# Patient Record
Sex: MALE | Race: WHITE | Employment: UNEMPLOYED | URBAN - METROPOLITAN AREA
[De-identification: names, ages, dates, MRNs, and addresses within clinical notes are randomized per-mention and may not be internally consistent; named-entity substitution may affect disease eponyms.]

---

## 2023-11-23 ENCOUNTER — HOSPITAL ENCOUNTER (EMERGENCY)
Facility: HOSPITAL | Age: 43
Discharge: HOME | End: 2023-11-23
Attending: EMERGENCY MEDICINE

## 2023-11-23 VITALS
RESPIRATION RATE: 18 BRPM | HEART RATE: 72 BPM | BODY MASS INDEX: 30.8 KG/M2 | OXYGEN SATURATION: 99 % | HEIGHT: 71 IN | SYSTOLIC BLOOD PRESSURE: 136 MMHG | TEMPERATURE: 98.2 F | WEIGHT: 220 LBS | DIASTOLIC BLOOD PRESSURE: 80 MMHG

## 2023-11-23 DIAGNOSIS — T78.40XA ALLERGIC REACTION, INITIAL ENCOUNTER: Primary | ICD-10-CM

## 2023-11-23 PROCEDURE — 99284 EMERGENCY DEPT VISIT MOD MDM: CPT | Performed by: EMERGENCY MEDICINE

## 2023-11-23 PROCEDURE — 99283 EMERGENCY DEPT VISIT LOW MDM: CPT

## 2023-11-23 RX ORDER — FAMOTIDINE 20 MG/1
20 TABLET, FILM COATED ORAL ONCE
Status: COMPLETED | OUTPATIENT
Start: 2023-11-23 | End: 2023-11-23

## 2023-11-23 RX ORDER — EPINEPHRINE 0.3 MG/.3ML
0.3 INJECTION SUBCUTANEOUS ONCE
Qty: 0.6 ML | Refills: 0 | Status: SHIPPED | OUTPATIENT
Start: 2023-11-23 | End: 2023-11-23

## 2023-11-23 RX ADMIN — FAMOTIDINE 20 MG: 20 TABLET, FILM COATED ORAL at 03:24

## 2023-11-23 RX ADMIN — DEXAMETHASONE 10 MG: 4 TABLET ORAL at 03:22

## 2023-11-23 NOTE — ED PROVIDER NOTES
History  Chief Complaint   Patient presents with    Medical Problem     Pt ate food, woke up with abd pain and diarrhea, reports hives all over, no known allergies, denies throat swelling     HPI  MDM  Pt is a 82-year-old male, no known allergies, presents with allergic reaction. States that he had dinner and slept last night and woke up with diffuse hives and also had an episode of diarrhea. States he had shrimp for dinner, and had that before as well without any allergic reaction. Denies throat swelling, wheezing, headache, lightheadedness, abdominal pain, emesis. Patient currently complains of itching. Took 1 dose of 25 mg Benadryl and came into the ED for further evaluation. On exam   General: VSS, NAD, awake, alert. Talking normally. Head: Normocephalic, atraumatic, nontender. Eyes: EOM-No subconjunctival hemorrhages. ENT: Nose atraumatic. MMM  No malocclusion. No stridor. Normal phonation. No drooling. Normal swallowing. Neck: Trachea midline. No JVD. CV: RRR. Lungs: CTAB No tachypnea. No paradoxical motion. Abd: +BS, soft, NT/ND. No guarding/rigidity. MSK: Full ROM throughout. No lower extremity edema. Skin: Diffuse urticaria. Neuro: AAOx3, GCS 15, CN II-XII grossly intact. Motor/sensory grossly intact. Psychiatric/Behavioral: mood/affect normal; behavior normal; thought content normal; judgement normal   Exam: deferred      Ddx: Allergic reaction    Plan: Patient is not wheezing, blood pressure normotensive, no signs of anaphylaxis. Given urticaria plus diarrhea patient was symptomatically treated with Decadron and Pepcid. Was discharged with prescription for EpiPen and outpatient follow-up with allergy. Discussed strict return precautions. Final Dispo: Pt is hemodynamically stable and clear for discharge with outpatient f/u with their PCP. Return precautions given pt verbalized understanding    None       History reviewed. No pertinent past medical history.     History reviewed. No pertinent surgical history. History reviewed. No pertinent family history. I have reviewed and agree with the history as documented. E-Cigarette/Vaping     E-Cigarette/Vaping Substances           Review of Systems    Physical Exam  ED Triage Vitals [11/23/23 0207]   Temperature Pulse Respirations Blood Pressure SpO2   98.2 °F (36.8 °C) 72 18 136/80 99 %      Temp Source Heart Rate Source Patient Position - Orthostatic VS BP Location FiO2 (%)   Oral Monitor Sitting Left arm --      Pain Score       10 - Worst Possible Pain             Orthostatic Vital Signs  Vitals:    11/23/23 0207   BP: 136/80   Pulse: 72   Patient Position - Orthostatic VS: Sitting       Physical Exam    ED Medications  Medications   dexamethasone (DECADRON) tablet 10 mg (10 mg Oral Given 11/23/23 0322)   famotidine (PEPCID) tablet 20 mg (20 mg Oral Given 11/23/23 0324)       Diagnostic Studies  Results Reviewed       None                   No orders to display         Procedures  Procedures      ED Course                                       Medical Decision Making  Risk  Prescription drug management. Disposition  Final diagnoses: Allergic reaction, initial encounter     Time reflects when diagnosis was documented in both MDM as applicable and the Disposition within this note       Time User Action Codes Description Comment    11/23/2023  3:38 AM Check, Anne Nageotte Add [T78.40XA] Allergic reaction, initial encounter           ED Disposition       ED Disposition   Discharge    Condition   Stable    Date/Time   u Nov 23, 2023  3:38 AM    Comment   Edy Zeng discharge to home/self care.                    Follow-up Information       Follow up With Specialties Details Why Contact Info Additional [address] [facility] Emergency Department Emergency Medicine  If symptoms worsen [address] Emergency Department, [phone] [place], [place], [address] Allergy, Asthma & Immunology Allergy Schedule an appointment as soon as possible for a visit   [address] [facility] [address] [place] [phone]  [address] [place] [address], [address], [address] [facility], 147 [facility]            Discharge Medication List as of 11/23/2023  3:39 AM        START taking these medications    Details   EPINEPHrine (EPIPEN) 0.3 mg/0.3 mL SOAJ Inject 0.3 mL (0.3 mg total) into a muscle once for 1 dose, Starting Thu 11/23/2023, Normal               PDMP Review       None             ED Provider  Attending physically available and evaluated Steffanie Lala. I managed the patient along with the ED Attending.     Electronically Signed by           Dominik Barbour DO  11/23/23 6460

## 2023-11-23 NOTE — ED ATTENDING ATTESTATION
11/23/2023  IAlfonso MD, saw and evaluated the patient. I have discussed the patient with the resident/non-physician practitioner and agree with the resident's/non-physician practitioner's findings, Plan of Care, and MDM as documented in the resident's/non-physician practitioner's note, except where noted. All available labs and Radiology studies were reviewed. I was present for key portions of any procedure(s) performed by the resident/non-physician practitioner and I was immediately available to provide assistance. At this point I agree with the current assessment done in the Emergency Department. I have conducted an independent evaluation of this patient a history and physical is as follows:    56-year-old male with no known allergies presents to the emergency department for evaluation of possible allergic reaction. Patient states he had a trip for dinner which she has had in the past, went to bed, then woke up with abdominal pain, had an episode of diarrhea and then started with a pruritic urticarial type rash. No difficulty breathing or swallowing. No chest pain or shortness of breath. Took Benadryl prior to arrival and is already feeling better. On exam, he is resting comfortably in bed in no acute distress, head is normocephalic atraumatic, pupils equal round reactive to light, neck is supple without meningismus signs, heart is regular rate and rhythm with intact distal pulses, no increased work of breathing, respiratory distress, or stridor. Urticarial rash present. Vital signs reviewed and stable. Suspect symptoms likely secondary to allergic reaction, no signs of anaphylaxis, patient hemodynamically stable and symptoms are already improving. Will treat with Decadron and Pepcid. Will discharge home with referral to allergist and with a prescription for an EpiPen. Patient given strict return precautions.     ED Course         Critical Care Time  Procedures

## 2023-11-23 NOTE — DISCHARGE INSTRUCTIONS
Take Benadryl as needed for itching and rash  Use EpiPen as prescribed  Follow-up with allergist and PCP    Return for any worsening symptoms

## 2024-06-24 ENCOUNTER — HOSPITAL ENCOUNTER (EMERGENCY)
Facility: HOSPITAL | Age: 44
Discharge: HOME | End: 2024-06-24
Attending: EMERGENCY MEDICINE

## 2024-06-24 VITALS
SYSTOLIC BLOOD PRESSURE: 148 MMHG | RESPIRATION RATE: 20 BRPM | OXYGEN SATURATION: 98 % | DIASTOLIC BLOOD PRESSURE: 83 MMHG | TEMPERATURE: 98.9 F | HEART RATE: 73 BPM

## 2024-06-24 DIAGNOSIS — M25.512 ACUTE PAIN OF LEFT SHOULDER: Primary | ICD-10-CM

## 2024-06-24 PROCEDURE — 99282 EMERGENCY DEPT VISIT SF MDM: CPT

## 2024-06-24 PROCEDURE — 99283 EMERGENCY DEPT VISIT LOW MDM: CPT | Performed by: EMERGENCY MEDICINE

## 2024-06-24 NOTE — ED PROVIDER NOTES
"Final Diagnosis:  No diagnosis found.  Chief Complaint   Patient presents with    Shoulder Injury     Pt arrives with a \"left  shoulder dislocation\"  Pt was walking and his arm got caught.  Pt has normal sensation in left arm and is able to move elbow up and down.         42 yo male presents for evaluation of L shoulder pain, decreased ROM. He was walking when his arm got caught. Hx of prior dislocation. No weakness or sensory changes. Unable to flex shoulder past 90 degrees, states it feels like it's going to pop out again.    ROS: as above    PMH:  No past medical history on file.  PSH:  No past surgical history on file.        PE:   Vitals:    06/24/24 1446   BP: 148/83   BP Location: Right arm   Pulse: 73   Resp: 20   Temp: 98.9 °F (37.2 °C)   TempSrc: Temporal   SpO2: 98%       General: VS reviewed  Appears in NAD  awake, alert.   Well-nourished, well-developed. Appears stated age.   Speaking normally in full sentences.   Head: Normocephalic, atraumatic  Eyes: EOM-I.  No subconjunctival hemorrhages.  Symmetrical lids.   ENT: Atraumatic external nose and ears.    MMM  No stridor. Normal phonation. No drooling. Normal swallowing.   Neck: No JVD.  CV: No pallor noted  Lungs:   No tachypnea  No respiratory distress  MSK:    L shoulder: full ROM except unable to flex more than 90 degrees due to sensation of instability. Skin intact.  Skin: Dry, intact.   Neuro: Awake, alert, GCS15  Motor grossly intact.  Psychiatric/Behavioral: Appropriate mood and affect   Exam: deferred      A:  - L shoulder injury, self reduced glenohumeral dislocation vs supraspinatus tear  P:  - would recommend Xray, but pt left from [facility].  - 13 point ROS was performed and all are normal unless stated in the history above.   - Nursing note reviewed. Vitals reviewed.   - Orders placed by myself and/or advanced practitioner / resident.    - Previous chart was reviewed  - No language barrier.   - History obtained from patient.   - There are no " "limitations to the history obtained.        Medications - No data to display  No orders to display     No orders of the defined types were placed in this encounter.    Labs Reviewed - No data to display  ED Disposition       ED Disposition   Discharge    Condition   --    Date/Time   Mon Jun 24, 2024  3:10 PM    Comment   Jericho Baldwin discharge to home/self care.                   Follow-up Information    None       Patient's Medications   Discharge Prescriptions    No medications on file     No discharge procedures on file.  Cannot display prior to admission medications because the patient has not been admitted in this contact.       Portions of the record may have been created with voice recognition software. Occasional wrong word or \"sound a like\" substitutions may have occurred due to the inherent limitations of voice recognition software. Read the chart carefully and recognize, using context, where substitutions have occurred.    Electronically signed by:  DO Zack Mcdonald DO  06/24/24 1516    "

## 2025-04-17 ENCOUNTER — HOSPITAL ENCOUNTER (EMERGENCY)
Facility: HOSPITAL | Age: 45
Discharge: HOME | End: 2025-04-17
Attending: EMERGENCY MEDICINE
Payer: COMMERCIAL

## 2025-04-17 VITALS
OXYGEN SATURATION: 95 % | TEMPERATURE: 98.2 F | RESPIRATION RATE: 18 BRPM | HEART RATE: 61 BPM | DIASTOLIC BLOOD PRESSURE: 71 MMHG | SYSTOLIC BLOOD PRESSURE: 127 MMHG

## 2025-04-17 DIAGNOSIS — E87.6 HYPOKALEMIA: ICD-10-CM

## 2025-04-17 DIAGNOSIS — R42 DIZZINESS: Primary | ICD-10-CM

## 2025-04-17 LAB
ANION GAP SERPL CALCULATED.3IONS-SCNC: 7 MMOL/L (ref 4–13)
BASOPHILS # BLD AUTO: 0.1 THOUSANDS/ÂΜL (ref 0–0.1)
BASOPHILS NFR BLD AUTO: 1 % (ref 0–1)
BUN SERPL-MCNC: 8 MG/DL (ref 5–25)
CALCIUM SERPL-MCNC: 9.5 MG/DL (ref 8.4–10.2)
CHLORIDE SERPL-SCNC: 102 MMOL/L (ref 96–108)
CO2 SERPL-SCNC: 29 MMOL/L (ref 21–32)
CREAT SERPL-MCNC: 0.87 MG/DL (ref 0.6–1.3)
EOSINOPHIL # BLD AUTO: 0.31 THOUSAND/ÂΜL (ref 0–0.61)
EOSINOPHIL NFR BLD AUTO: 2 % (ref 0–6)
ERYTHROCYTE [DISTWIDTH] IN BLOOD BY AUTOMATED COUNT: 13.3 % (ref 11.6–15.1)
GFR SERPL CREATININE-BSD FRML MDRD: 106 ML/MIN/1.73SQ M
GLUCOSE SERPL-MCNC: 93 MG/DL (ref 65–140)
HCT VFR BLD AUTO: 42.2 % (ref 36.5–49.3)
HGB BLD-MCNC: 13.7 G/DL (ref 12–17)
IMM GRANULOCYTES # BLD AUTO: 0.04 THOUSAND/UL (ref 0–0.2)
IMM GRANULOCYTES NFR BLD AUTO: 0 % (ref 0–2)
LYMPHOCYTES # BLD AUTO: 5.51 THOUSANDS/ÂΜL (ref 0.6–4.47)
LYMPHOCYTES NFR BLD AUTO: 42 % (ref 14–44)
MCH RBC QN AUTO: 27.1 PG (ref 26.8–34.3)
MCHC RBC AUTO-ENTMCNC: 32.5 G/DL (ref 31.4–37.4)
MCV RBC AUTO: 83 FL (ref 82–98)
MONOCYTES # BLD AUTO: 0.74 THOUSAND/ÂΜL (ref 0.17–1.22)
MONOCYTES NFR BLD AUTO: 6 % (ref 4–12)
NEUTROPHILS # BLD AUTO: 6.54 THOUSANDS/ÂΜL (ref 1.85–7.62)
NEUTS SEG NFR BLD AUTO: 49 % (ref 43–75)
NRBC BLD AUTO-RTO: 0 /100 WBCS
PLATELET # BLD AUTO: 274 THOUSANDS/UL (ref 149–390)
PMV BLD AUTO: 11 FL (ref 8.9–12.7)
POTASSIUM SERPL-SCNC: 3.4 MMOL/L (ref 3.5–5.3)
RBC # BLD AUTO: 5.06 MILLION/UL (ref 3.88–5.62)
SODIUM SERPL-SCNC: 138 MMOL/L (ref 135–147)
WBC # BLD AUTO: 13.24 THOUSAND/UL (ref 4.31–10.16)

## 2025-04-17 PROCEDURE — 36415 COLL VENOUS BLD VENIPUNCTURE: CPT

## 2025-04-17 PROCEDURE — 93005 ELECTROCARDIOGRAM TRACING: CPT

## 2025-04-17 PROCEDURE — 99285 EMERGENCY DEPT VISIT HI MDM: CPT | Performed by: EMERGENCY MEDICINE

## 2025-04-17 PROCEDURE — 80048 BASIC METABOLIC PNL TOTAL CA: CPT

## 2025-04-17 PROCEDURE — 85025 COMPLETE CBC W/AUTO DIFF WBC: CPT

## 2025-04-17 PROCEDURE — 99284 EMERGENCY DEPT VISIT MOD MDM: CPT

## 2025-04-17 RX ORDER — MECLIZINE HYDROCHLORIDE 25 MG/1
25 TABLET ORAL ONCE
Status: COMPLETED | OUTPATIENT
Start: 2025-04-17 | End: 2025-04-17

## 2025-04-17 RX ORDER — POTASSIUM CHLORIDE 1500 MG/1
20 TABLET, EXTENDED RELEASE ORAL ONCE
Status: COMPLETED | OUTPATIENT
Start: 2025-04-17 | End: 2025-04-17

## 2025-04-17 RX ADMIN — MECLIZINE HYDROCHLORIDE 25 MG: 25 TABLET ORAL at 16:44

## 2025-04-17 RX ADMIN — POTASSIUM CHLORIDE 20 MEQ: 1500 TABLET, EXTENDED RELEASE ORAL at 16:46

## 2025-04-17 NOTE — DISCHARGE INSTRUCTIONS
You were seen and evaluated in the emergency department for dizziness.  Your workup in the emergency department revealed no acute abnormalities.  Your potassium was a bit low which was repleted with oral potassium.  Your symptoms are most likely consistent with vertigo.  I advise that you call the phone number listed below to schedule a follow-up appointment since you were seen and evaluated in the emergency department.  If you experience any new or worsening symptoms please return to the emergency department.

## 2025-04-17 NOTE — ED ATTENDING ATTESTATION
4/17/2025  I, Fahad Mathews MD, saw and evaluated the patient. I have discussed the patient with the resident/non-physician practitioner and agree with the resident's/non-physician practitioner's findings, Plan of Care, and MDM as documented in the resident's/non-physician practitioner's note, except where noted. All available labs and Radiology studies were reviewed.  I was present for key portions of any procedure(s) performed by the resident/non-physician practitioner and I was immediately available to provide assistance.       At this point I agree with the current assessment done in the Emergency Department.  I have conducted an independent evaluation of this patient a history and physical is as follows:    ED Course     Emergency Department Note- Jericho Baldwin 44 y.o. male MRN: [id number]    Unit/Bed#: Z5HA Encounter: [id number]    Jericho Baldwin is a 44 y.o. male who presents with   Chief Complaint   Patient presents with    Medical Problem     C/o ringing in R ear and dizziness since last night. States he had a headache yesterday but it has subsided.          History of Present Illness   HPI:  Jericho Baldwin is a 44 y.o. male who presents for evaluation of:  Right ear ringing and dizziness that started last night.  Her symptoms are worse when he stands up and moves around and somewhat improved when he sits down and rests.  He had a headache earlier that is completely resolved currently.  He does not take any anticoagulant or antiplatelet medications.  Has a distant history of traumatic brain injury from which he is completely recovered from.  He denies any drainage from either of his ears.  His headache when he had it was bilateral and bifrontal.  He denies associated photophobia and neck pain.    Review of Systems   Constitutional:  Negative for fatigue and fever.   HENT:  Positive for ear pain. Negative for congestion, ear discharge and sore throat.    Respiratory:  Negative for cough and shortness  of breath.    Cardiovascular:  Negative for chest pain and palpitations.   Gastrointestinal:  Negative for abdominal pain and nausea.   Genitourinary:  Negative for flank pain and frequency.   Neurological:  Positive for dizziness and headaches. Negative for light-headedness.   Psychiatric/Behavioral:  Negative for dysphoric mood and hallucinations.    All other systems reviewed and are negative.      Historical Information   Past Medical History:   Diagnosis Date    TBI (traumatic brain injury) ([facility]) 2012     History reviewed. No pertinent surgical history.  Social History   Social History     Substance and Sexual Activity   Alcohol Use None     Social History     Substance and Sexual Activity   Drug Use Not on file     Social History     Tobacco Use   Smoking Status Every Day    Current packs/day: 0.50    Types: Cigarettes   Smokeless Tobacco Not on file     Family History: History reviewed. No pertinent family history.    Meds/Allergies   PTA meds:   Prior to Admission Medications   Prescriptions Last Dose Informant Patient Reported? Taking?   EPINEPHrine (EPIPEN) 0.3 mg/0.3 mL SOAJ   No No   Sig: Inject 0.3 mL (0.3 mg total) into a muscle once for 1 dose      Facility-Administered Medications: None     No Known Allergies    Objective   First Vitals:   Blood Pressure: 137/68 (04/17/25 1333)  Pulse: 66 (04/17/25 1333)  Temperature: 98.2 °F (36.8 °C) (04/17/25 1333)  Temp Source: Tympanic (04/17/25 1333)  Respirations: 18 (04/17/25 1333)  SpO2: 99 % (04/17/25 1333)    Current Vitals:   Blood Pressure: 137/68 (04/17/25 1333)  Pulse: 66 (04/17/25 1333)  Temperature: 98.2 °F (36.8 °C) (04/17/25 1333)  Temp Source: Tympanic (04/17/25 1333)  Respirations: 18 (04/17/25 1333)  SpO2: 99 % (04/17/25 1333)    No intake or output data in the 24 hours ending 04/17/25 1617    Invasive Devices       Peripheral Intravenous Line  Duration             Peripheral IV 04/17/25 Proximal;Right;Ventral (anterior) Forearm <1 day                     Physical Exam  Vitals and nursing note reviewed.   Constitutional:       General: He is not in acute distress.     Appearance: Normal appearance. He is well-developed.   HENT:      Head: Normocephalic and atraumatic.      Right Ear: External ear normal.      Left Ear: External ear normal.      Nose: Nose normal.      Mouth/Throat:      Pharynx: No oropharyngeal exudate.   Eyes:      Conjunctiva/sclera: Conjunctivae normal.      Pupils: Pupils are equal, round, and reactive to light.   Cardiovascular:      Rate and Rhythm: Normal rate and regular rhythm.   Pulmonary:      Effort: Pulmonary effort is normal. No respiratory distress.   Abdominal:      General: Abdomen is flat. There is no distension.      Palpations: Abdomen is soft.   Musculoskeletal:         General: No deformity. Normal range of motion.      Cervical back: Normal range of motion and neck supple.   Skin:     General: Skin is warm and dry.      Capillary Refill: Capillary refill takes less than 2 seconds.   Neurological:      General: No focal deficit present.      Mental Status: He is alert and oriented to person, place, and time. Mental status is at baseline.      Coordination: Coordination normal.   Psychiatric:         Mood and Affect: Mood normal.         Behavior: Behavior normal.         Thought Content: Thought content normal.         Judgment: Judgment normal.     Hints exam is negative: Head impulse negative, nystagmus horizontal beating fatigue; test of skew is negative.      Medical Decision Makin.  Dizziness, right ear ringing: ECG rule out arrhythmia; CBC rule out leukocytosis and anemia; basic metabolic profile rule out electrolyte disturbance, uremia, and disorders of glucose homeostasis.    Recent Results (from the past 36 hours)   ECG 12 lead    Collection Time: 25  4:11 PM   Result Value Ref Range    Ventricular Rate 54 BPM    Atrial Rate 54 BPM    ME Interval 176 ms    QRSD Interval 90 ms    QT Interval 432  "ms    QTC Interval 409 ms    P [place] 46 degrees    QRS Axis 41 degrees    T Wave Axis 24 degrees     No orders to display         Portions of the record may have been created with voice recognition software. Occasional wrong word or \"sound a like\" substitutions may have occurred due to the inherent limitations of voice recognition software.  Read the chart carefully and recognize, using context, where substitutions have occurred.        Critical Care Time  ECG 12 Lead Documentation Only    Date/Time: 4/17/2025 4:21 PM    Performed by: Fahad Mathews MD  Authorized by: Fahad Mathews MD    Indications / Diagnosis:  Dizziness  ECG reviewed by me, the ED Provider: yes    Patient location:  ED  Previous ECG:     Previous ECG:  Compared to current    Comparison ECG info:  January 6, 2013    Similarity:  No change  Interpretation:     Interpretation: normal    Rate:     ECG rate:  54    ECG rate assessment: bradycardic    Rhythm:     Rhythm: sinus bradycardia    Ectopy:     Ectopy: none    QRS:     QRS axis:  Normal    QRS intervals:  Normal  Conduction:     Conduction: normal    T waves:     T waves: normal          "

## 2025-04-17 NOTE — ED PROVIDER NOTES
Time reflects when diagnosis was documented in both MDM as applicable and the Disposition within this note       Time User Action Codes Description Comment    4/17/2025  5:11 PM Diana Gay Add [R42] Dizziness     4/17/2025  5:11 PM Diana Gay Add [E87.6] Hypokalemia           ED Disposition       ED Disposition   Discharge    Condition   Stable    Date/Time   Thu Apr 17, 2025  5:11 PM    Comment   Jericho Baldwin discharge to home/self care.                   Assessment & Plan       Medical Decision Making  Patient is a 44-year-old male who presents today for evaluation of headache and dizziness.  Vital signs: Hemodynamically stable     Pertinent physical exam: Normal rate and rhythm, lungs are clear to auscultation bilateral no focal neurological deficits.  TMs normal bilaterally.     Differential diagnosis and plan:   Differential diagnosis includes but is not limited to migraine headache, benign positional vertigo, sinusitis, electrolyte abnormality, arrhythmia, anemia, hypoglycemia.  Plan to obtain ECG to evaluate for arrhythmia.  Will obtain CBC to evaluate for leukocytosis and anemia, will obtain BMP to evaluate for electrolyte abnormalities.  Will treat with meclizine.     View ED course above for further discussion on patient workup.      All labs reviewed and utilized in the medical decision making process  I reviewed all testing with the patient.      ED course:  Patient treated with meclizine and symptoms improved in the emergency department.  Workup in the emergency department revealed potassium of 3.4 which was repleted with 20 of oral potassium.  At this time I believe patient's symptoms are most likely consistent with vertigo.  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     Disposition: Patient is stable to be discharged home.  I gave him the phone number to establish care with a primary care physician.  I encouraged him to follow-up with  "a primary care physician to have repeat blood work for hypokalemia.  Strict return precautions were given.  Patient is agreeable with this plan understand strict return precautions and all questions were answered prior to discharge.     Portions of the record may have been created with voice recognition software. Occasional wrong word or \"sound a like\" substitutions may have occurred due to the inherent limitations of voice recognition software. Read the chart carefully and recognize, using context, where substitutions have occurred.      Amount and/or Complexity of Data Reviewed  Labs: ordered.    Risk  Prescription drug management.             Medications   meclizine (ANTIVERT) tablet 25 mg (25 mg Oral Given 4/17/25 1644)   potassium chloride (Klor-Con M20) CR tablet 20 mEq (20 mEq Oral Given 4/17/25 1646)       ED Risk Strat Scores                    No data recorded        SBIRT 22yo+      Flowsheet Row Most Recent Value   Initial Alcohol Screen: US AUDIT-C     1. How often do you have a drink containing alcohol? 0 Filed at: 04/17/2025 1335   2. How many drinks containing alcohol do you have on a typical day you are drinking?  0 Filed at: 04/17/2025 1335   3a. Male UNDER 65: How often do you have five or more drinks on one occasion? 0 Filed at: 04/17/2025 1335   Audit-C Score 0 Filed at: 04/17/2025 1335   LADI: How many times in the past year have you...    Used an illegal drug or used a prescription medication for non-medical reasons? Never Filed at: 04/17/2025 1335                            History of Present Illness       Chief Complaint   Patient presents with    Medical Problem     C/o ringing in R ear and dizziness since last night. States he had a headache yesterday but it has subsided.        Past Medical History:   Diagnosis Date    TBI (traumatic brain injury) ([facility]) 2012      History reviewed. No pertinent surgical history.   History reviewed. No pertinent family history.   Social History     Tobacco " Use    Smoking status: Every Day     Current packs/day: 0.50     Types: Cigarettes      E-Cigarette/Vaping      E-Cigarette/Vaping Substances      I have reviewed and agree with the history as documented.     Patient is a 44-year-old male with past medical history of traumatic brain injury who presents today for evaluation of dizziness and headache.  Yesterday the patient states that he had his typical headache that was gradual in onset.  He took Motrin and went to sleep and woke up and the headache was resolved.  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  He states he was able to sit down after the event which improved his symptoms.  He had 1 additional episode while at work which resolved with resting.  He states since arriving to the emergency department he has not had any additional episodes.  He denies headache today.  He denies hearing loss, ear pain, ear drainage, fevers, chills, sinus pressure.  He states he has a history of a traumatic brain injury many years ago which she completely recovered from.  He denies any vision changes, photophobia, neck pain, weakness, numbness or tingling.          Review of Systems   Constitutional:  Negative for chills and fever.   HENT:  Negative for congestion, rhinorrhea and sore throat.    Eyes:  Negative for photophobia and visual disturbance.   Respiratory:  Negative for shortness of breath.    Cardiovascular:  Negative for chest pain.   Gastrointestinal:  Negative for abdominal pain, diarrhea, nausea and vomiting.   Genitourinary:  Negative for dysuria and hematuria.   Musculoskeletal:  Negative for back pain.   Neurological:  Positive for dizziness, light-headedness and headaches. Negative for syncope, weakness and numbness.           Objective       ED Triage Vitals [04/17/25 1333]   Temperature Pulse Blood Pressure Respirations SpO2 Patient  Position - Orthostatic VS   98.2 °F (36.8 °C) 66 137/68 18 99 % Sitting      Temp Source Heart Rate Source BP Location FiO2 (%) Pain Score    Tympanic Monitor Left arm -- --      Vitals      Date and Time Temp Pulse SpO2 Resp BP Pain Score FACES Pain Rating User   04/17/25 1642 -- 61 95 % 18 127/71 -- -- FR   04/17/25 1333 98.2 °F (36.8 °C) 66 99 % 18 137/68 -- --             Physical Exam  Vitals and nursing note reviewed.   Constitutional:       General: He is not in acute distress.     Appearance: He is well-developed and normal weight. He is not ill-appearing.   HENT:      Head: Normocephalic and atraumatic.      Right Ear: Tympanic membrane, ear canal and external ear normal.      Left Ear: Tympanic membrane, ear canal and external ear normal.      Mouth/Throat:      Mouth: Mucous membranes are moist.   Eyes:      Conjunctiva/sclera: Conjunctivae normal.   Cardiovascular:      Rate and Rhythm: Normal rate and regular rhythm.      Heart sounds: Normal heart sounds.   Pulmonary:      Effort: Pulmonary effort is normal. No respiratory distress.      Breath sounds: Normal breath sounds.   Abdominal:      General: Abdomen is flat. There is no distension.      Palpations: Abdomen is soft.      Tenderness: There is no abdominal tenderness.   Musculoskeletal:      Cervical back: Normal range of motion. No tenderness.      Right lower leg: No edema.      Left lower leg: No edema.   Skin:     General: Skin is warm and dry.      Capillary Refill: Capillary refill takes less than 2 seconds.   Neurological:      General: No focal deficit present.      Mental Status: He is alert and oriented to person, place, and time.      Comments: Facial symmetric, tongue midline, pupils equal and reactive.  Normal speech. Extraocular movements intact. No nystagmus. CN II-VII intact. Sensation grossly intact. 5/5 proximal and distal upper and lower extremity strength. Normal finger-to-nose bilaterally, normal heel-to-shin bilaterally.   No abnormal gait.  No pronator drift.     Psychiatric:         Mood and Affect: Mood normal.         Behavior: Behavior normal.         Results Reviewed       Procedure Component Value Units Date/Time    Basic metabolic panel [[id number]]  (Abnormal) Collected: 04/17/25 1602    Lab Status: Final result Specimen: Blood from Arm, Left Updated: 04/17/25 1640     Sodium 138 mmol/L      Potassium 3.4 mmol/L      Chloride 102 mmol/L      CO2 29 mmol/L      ANION GAP 7 mmol/L      BUN 8 mg/dL      Creatinine 0.87 mg/dL      Glucose 93 mg/dL      Calcium 9.5 mg/dL      eGFR 106 ml/min/1.73sq m     Narrative:      [employer] guidelines for Chronic Kidney Disease (CKD):     Stage 1 with normal or high GFR (GFR > 90 mL/min/1.73 square meters)    Stage 2 Mild CKD (GFR = 60-89 mL/min/1.73 square meters)    Stage 3A Moderate CKD (GFR = 45-59 mL/min/1.73 square meters)    Stage 3B Moderate CKD (GFR = 30-44 mL/min/1.73 square meters)    Stage 4 Severe CKD (GFR = 15-29 mL/min/1.73 square meters)    Stage 5 End Stage CKD (GFR <15 mL/min/1.73 square meters)  Note: GFR calculation is accurate only with a steady state creatinine    CBC and differential [[id number]]  (Abnormal) Collected: 04/17/25 1602    Lab Status: Final result Specimen: Blood from Arm, Left Updated: 04/17/25 1628     WBC 13.24 Thousand/uL      RBC 5.06 Million/uL      Hemoglobin 13.7 g/dL      Hematocrit 42.2 %      MCV 83 fL      MCH 27.1 pg      MCHC 32.5 g/dL      RDW 13.3 %      MPV 11.0 fL      Platelets 274 Thousands/uL      nRBC 0 /100 WBCs      Segmented % 49 %      Immature Grans % 0 %      Lymphocytes % 42 %      Monocytes % 6 %      Eosinophils Relative 2 %      Basophils Relative 1 %      Absolute Neutrophils 6.54 Thousands/µL      Absolute Immature Grans 0.04 Thousand/uL      Absolute Lymphocytes 5.51 Thousands/µL      Absolute Monocytes 0.74 Thousand/µL      Eosinophils Absolute 0.31 Thousand/µL      Basophils Absolute 0.10  Thousands/µL             No orders to display       ECG 12 Lead Documentation Only    Date/Time: 4/20/2025 6:59 AM    Performed by: Diana Gay DO  Authorized by: Diana Gay DO    Indications / Diagnosis:  Dizziness  ECG reviewed by me, the ED Provider: yes    Patient location:  ED  Previous ECG:     Previous ECG:  Compared to current    Similarity:  Changes noted  Interpretation:     Interpretation: normal    Rate:     ECG rate:  54    ECG rate assessment: bradycardic    Rhythm:     Rhythm: sinus bradycardia    Ectopy:     Ectopy: none    QRS:     QRS axis:  Normal    QRS intervals:  Normal  Conduction:     Conduction: normal    ST segments:     ST segments:  Normal  T waves:     T waves: normal        ED Medication and Procedure Management   Prior to Admission Medications   Prescriptions Last Dose Informant Patient Reported? Taking?   EPINEPHrine (EPIPEN) 0.3 mg/0.3 mL SOAJ   No No   Sig: Inject 0.3 mL (0.3 mg total) into a muscle once for 1 dose      Facility-Administered Medications: None     Discharge Medication List as of 4/17/2025  5:13 PM        CONTINUE these medications which have NOT CHANGED    Details   EPINEPHrine (EPIPEN) 0.3 mg/0.3 mL SOAJ Inject 0.3 mL (0.3 mg total) into a muscle once for 1 dose, Starting Thu 11/23/2023, Normal           No discharge procedures on file.  ED SEPSIS DOCUMENTATION   Time reflects when diagnosis was documented in both MDM as applicable and the Disposition within this note       Time User Action Codes Description Comment    4/17/2025  5:11 PM Diana Gay [R42] Dizziness     4/17/2025  5:11 PM Diaan Gay Add [E87.6] Hypokalemia                  Diana Gay DO  04/20/25 0710

## 2025-04-18 LAB
ATRIAL RATE: 54 BPM
P AXIS: 46 DEGREES
PR INTERVAL: 176 MS
QRS AXIS: 41 DEGREES
QRSD INTERVAL: 90 MS
QT INTERVAL: 432 MS
QTC INTERVAL: 409 MS
T WAVE AXIS: 24 DEGREES
VENTRICULAR RATE: 54 BPM

## 2025-04-18 PROCEDURE — 93010 ELECTROCARDIOGRAM REPORT: CPT | Performed by: INTERNAL MEDICINE
